# Patient Record
Sex: FEMALE | URBAN - METROPOLITAN AREA
[De-identification: names, ages, dates, MRNs, and addresses within clinical notes are randomized per-mention and may not be internally consistent; named-entity substitution may affect disease eponyms.]

---

## 2021-10-19 ENCOUNTER — APPOINTMENT (RX ONLY)
Dept: URBAN - METROPOLITAN AREA CLINIC 96 | Facility: CLINIC | Age: 26
Setting detail: DERMATOLOGY
End: 2021-10-19

## 2021-10-19 DIAGNOSIS — L30.8 OTHER SPECIFIED DERMATITIS: ICD-10-CM | Status: INADEQUATELY CONTROLLED

## 2021-10-19 PROCEDURE — ? PRESCRIPTION MEDICATION MANAGEMENT

## 2021-10-19 PROCEDURE — ? PRESCRIPTION

## 2021-10-19 PROCEDURE — 99204 OFFICE O/P NEW MOD 45 MIN: CPT

## 2021-10-19 PROCEDURE — ? COUNSELING

## 2021-10-19 RX ORDER — TRIAMCINOLONE ACETONIDE 1 MG/G
1 CREAM TOPICAL BID
Qty: 1 | Refills: 0 | Status: ERX | COMMUNITY
Start: 2021-10-19

## 2021-10-19 RX ORDER — PREDNISONE 10 MG/1
TABLET ORAL QD
Qty: 18 | Refills: 0 | Status: ERX | COMMUNITY
Start: 2021-10-19

## 2021-10-19 RX ADMIN — PREDNISONE: 10 TABLET ORAL at 00:00

## 2021-10-19 RX ADMIN — TRIAMCINOLONE ACETONIDE 1: 1 CREAM TOPICAL at 00:00

## 2021-10-19 ASSESSMENT — SEVERITY ASSESSMENT: SEVERITY: MODERATE

## 2021-10-19 NOTE — PROCEDURE: PRESCRIPTION MEDICATION MANAGEMENT
Initiate Treatment: Prednisone taper starting at 30mg x 3 days\\nTriamcinolone 1% cream twice daily x 2 weeks on/ 2 weeks off then PRN\\nAllegra 180 daily \\nEucerine advance lotion
Plan: If not better with recommended treatment we will consider referral to allergist
Detail Level: Zone
Render In Strict Bullet Format?: No